# Patient Record
Sex: FEMALE | URBAN - METROPOLITAN AREA
[De-identification: names, ages, dates, MRNs, and addresses within clinical notes are randomized per-mention and may not be internally consistent; named-entity substitution may affect disease eponyms.]

---

## 2021-10-28 ENCOUNTER — OUT OF OFFICE VISIT (OUTPATIENT)
Dept: URBAN - METROPOLITAN AREA MEDICAL CENTER 1 | Facility: MEDICAL CENTER | Age: 71
End: 2021-10-28
Payer: MEDICARE

## 2021-10-28 DIAGNOSIS — K57.32 DIVERTICULITIS LARGE INTESTINE: ICD-10-CM

## 2021-10-28 DIAGNOSIS — R93.3 ABN FINDINGS-GI TRACT: ICD-10-CM

## 2021-10-28 DIAGNOSIS — K59.09 CHANGE IN BOWEL MOVEMENTS INTERMITTENT CONSTIPATION. URGENCY IN THE MORNING.: ICD-10-CM

## 2021-10-28 DIAGNOSIS — D64.89 ANEMIA DUE TO OTHER CAUSE: ICD-10-CM

## 2021-10-28 PROCEDURE — 99222 1ST HOSP IP/OBS MODERATE 55: CPT | Performed by: INTERNAL MEDICINE

## 2021-10-28 PROCEDURE — G8427 DOCREV CUR MEDS BY ELIG CLIN: HCPCS | Performed by: INTERNAL MEDICINE

## 2022-01-10 ENCOUNTER — OFFICE VISIT (OUTPATIENT)
Dept: URBAN - NONMETROPOLITAN AREA CLINIC 2 | Facility: CLINIC | Age: 72
End: 2022-01-10
Payer: MEDICARE

## 2022-01-10 ENCOUNTER — DASHBOARD ENCOUNTERS (OUTPATIENT)
Age: 72
End: 2022-01-10

## 2022-01-10 VITALS
DIASTOLIC BLOOD PRESSURE: 85 MMHG | SYSTOLIC BLOOD PRESSURE: 160 MMHG | BODY MASS INDEX: 36.02 KG/M2 | WEIGHT: 190.8 LBS | HEIGHT: 61 IN | HEART RATE: 130 BPM

## 2022-01-10 DIAGNOSIS — K92.1 HEMATOCHEZIA: ICD-10-CM

## 2022-01-10 DIAGNOSIS — K57.92 DIVERTICULITIS: ICD-10-CM

## 2022-01-10 DIAGNOSIS — Z12.11 COLON CANCER SCREENING: ICD-10-CM

## 2022-01-10 PROBLEM — 2901004: Status: ACTIVE | Noted: 2022-01-10

## 2022-01-10 PROBLEM — 305058001: Status: ACTIVE | Noted: 2022-01-10

## 2022-01-10 PROBLEM — 449341000124102: Status: ACTIVE | Noted: 2022-01-10

## 2022-01-10 PROBLEM — 307496006: Status: ACTIVE | Noted: 2022-01-10

## 2022-01-10 PROCEDURE — 99213 OFFICE O/P EST LOW 20 MIN: CPT | Performed by: INTERNAL MEDICINE

## 2022-01-10 NOTE — PHYSICAL EXAM CONSTITUTIONAL:
well developed, well nourished , in no acute distress , sitting in wheelchair, normal communication ability

## 2022-01-10 NOTE — HPI-TODAY'S VISIT:
1/10/2022: Gastroenterology Clinic Visit   Ms. Karimi is a 71 year old female with past medical history of hypertension, COVID, who presents after being hospitalized at [facility] from 10/27/2021 to 10/29/2021 for vaginal bleeding.  She presents with her son.   During Ms. Karimi's hospitalization, there was a question of hematochezia versus melena although per nursing report the blood came from the vaginal canal. During the hospitalization, Ms. Karimi underwent a CT Abdomen/Pelvis showed diverticuli in the colon with very subtle stranding adjacent to diverticulum in the distal descending colon. Additionally, the endometrium was thickened and heterogenous measuring 1.8 cm in thickness with an area of irregularity endometrium anteriorly and superiorly.   As there was concern for diverticulitis, an EGD + colonoscopy was not performed during the hospitalization. She was treated with antibiotics for diverticulitis during the hospitalization.   Since discharge from the hospital, Ms. Karimi was seen by OB-GYN for her endometrial thickening.   Ms. Kariim has not experienced melena or hematochezia. She denies abdominal pain.  Ms. Karimi has never undergone EGD or colonoscopy.  She is not on systemic anticoagulation.   She denies family history of colon polyps or colon cancer.  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

## 2024-01-08 NOTE — PHYSICAL EXAM NEUROLOGIC:
oriented to person, place and time , short and long term memory intact 
Universal Safety Interventions